# Patient Record
Sex: FEMALE | Race: WHITE | NOT HISPANIC OR LATINO | Employment: FULL TIME | URBAN - NONMETROPOLITAN AREA
[De-identification: names, ages, dates, MRNs, and addresses within clinical notes are randomized per-mention and may not be internally consistent; named-entity substitution may affect disease eponyms.]

---

## 2023-09-13 PROBLEM — E04.1 THYROID NODULE: Status: ACTIVE | Noted: 2023-09-13

## 2023-09-13 PROBLEM — H66.92 ACUTE LEFT OTITIS MEDIA: Status: RESOLVED | Noted: 2023-09-13 | Resolved: 2023-09-13

## 2023-10-23 ENCOUNTER — OFFICE VISIT (OUTPATIENT)
Dept: PRIMARY CARE | Facility: CLINIC | Age: 58
End: 2023-10-23
Payer: COMMERCIAL

## 2023-10-23 VITALS
SYSTOLIC BLOOD PRESSURE: 122 MMHG | BODY MASS INDEX: 20.44 KG/M2 | HEIGHT: 66 IN | WEIGHT: 127.2 LBS | OXYGEN SATURATION: 99 % | HEART RATE: 79 BPM | DIASTOLIC BLOOD PRESSURE: 70 MMHG

## 2023-10-23 DIAGNOSIS — Z00.00 WELL ADULT EXAM: Primary | ICD-10-CM

## 2023-10-23 DIAGNOSIS — E04.1 THYROID NODULE: ICD-10-CM

## 2023-10-23 PROCEDURE — 1036F TOBACCO NON-USER: CPT | Performed by: FAMILY MEDICINE

## 2023-10-23 PROCEDURE — 99396 PREV VISIT EST AGE 40-64: CPT | Performed by: FAMILY MEDICINE

## 2023-10-23 NOTE — PROGRESS NOTES
"Subjective   Patient ID: Rustam Dickerson is a 58 y.o. female who presents for Annual Exam.    HPI   Since the last office visit there have been no interval operations, hospitalizations, important illnesses or injuries.  [place] for gyn and mmg.  Colon jan 22 at 5 yr due on 28  Tob -0 alc 2 wk, no illicits. Works at [employer]  Exercise- 4x wk  Review of Systems  General-no fatigue weight to within 10 pounds  ENT no problems with vision swallowing  Cardiac no chest pains palpitations change in exercise tolerance or capacity  Pulmonary no cough shortness of breath  GI no heartburn or abdominal pain  Musculoskeletal no joint pains    Objective   /70   Pulse 79   Ht 1.676 m (5' 6\")   Wt 57.7 kg (127 lb 3.2 oz)   SpO2 99%   BMI 20.53 kg/m²     Physical Exam  General:  Alert, No acute distress. Appears stated age  Eye:  Pupils are equal, round and reactive to light, Extraocular movements are intact, Normal conjunctiva.    Neck:  Supple, Non-tender, No carotid bruit, No jugular venous distention, No lymphadenopathy, No thyromegaly.    Respiratory:  Lungs are clear to auscultation, Respirations are non-labored, Breath sounds are equal.    Cardiovascular:  Normal rate, Regular rhythm, No murmur.    Gastrointestinal:  Soft, Non-tender, No organomegaly. No solid or pulsatile mass  Integumentary:  Warm, Dry. No concerning lesions on exposed areas  Neurologic:  Alert, Oriented.  Gross and fine motor intact, CN 2-12 intact  Psychiatric:  Cooperative, Appropriate mood & affect.  Assessment/Plan   Problem List Items Addressed This Visit             ICD-10-CM    Thyroid nodule E04.1    Relevant Orders    CBC    Comprehensive Metabolic Panel    Lipid Panel    Thyroid Stimulating Hormone    Thyroid Peroxidase (TPO) Antibody    Well adult exam - Primary Z00.00    Relevant Orders    CBC    Comprehensive Metabolic Panel    Lipid Panel    Follow Up In Primary Care - Established          "

## 2023-11-06 ENCOUNTER — OFFICE VISIT (OUTPATIENT)
Dept: OBSTETRICS AND GYNECOLOGY | Facility: CLINIC | Age: 58
End: 2023-11-06
Payer: COMMERCIAL

## 2023-11-06 VITALS
SYSTOLIC BLOOD PRESSURE: 120 MMHG | HEIGHT: 66 IN | BODY MASS INDEX: 20.09 KG/M2 | DIASTOLIC BLOOD PRESSURE: 68 MMHG | WEIGHT: 125 LBS

## 2023-11-06 DIAGNOSIS — Z12.31 BREAST CANCER SCREENING BY MAMMOGRAM: Primary | ICD-10-CM

## 2023-11-06 PROCEDURE — 1036F TOBACCO NON-USER: CPT | Performed by: OBSTETRICS & GYNECOLOGY

## 2023-11-06 PROCEDURE — 99396 PREV VISIT EST AGE 40-64: CPT | Performed by: OBSTETRICS & GYNECOLOGY

## 2023-11-06 NOTE — PROGRESS NOTES
"Subjective   Patient ID: Rustam Dickerson is a 58 y.o. female who presents for Gynecologic Exam (Patient is here for a yearly exam. Patient has no concerns.).  HPI  Jasmin is a 58-year-old woman who comes in for routine GYN exam.  She has no history of abnormal Pap smears she had a colonoscopy less than a year ago which was totally normal.  She is not taking any type of hormone replacement therapy.  She is due for mammogram this month.      Review of Systems  Denies any significant change in health  Denies any shortness of breath  Denies any chest pain or palpitations  Denies any abdominal pain or changes in bowel habits  GYN Per HPI  Musculoskeletal denies any changes in mobility  Psych denies any changes in her mood or in her sleep  Objective   Physical Exam      10/19/2021     8:19 AM 11/3/2021    11:23 AM 10/20/2022     8:10 AM 11/4/2022     9:17 AM 1/10/2023     8:22 AM 10/23/2023     7:58 AM 11/6/2023     9:35 AM   Vitals   Systolic 102 110 114 116 112 122 120   Diastolic 70 72 72 76 60 70 68   Heart Rate 83  86  88 79    Temp 36.6 °C (97.8 °F) 37.1 °C (98.7 °F)        Height (in) 1.676 m (5' 6\") 1.676 m (5' 6\") 1.676 m (5' 6\") 1.676 m (5' 6\") 1.676 m (5' 6\") 1.676 m (5' 6\") 1.676 m (5' 6\")   Weight (lb) 119.25 121.25 122.56 122.8 122 127.2 125   BMI 19.25 kg/m2 19.57 kg/m2 19.78 kg/m2 19.82 kg/m2 19.69 kg/m2 20.53 kg/m2 20.18 kg/m2   BSA (m2) 1.59 m2 1.6 m2 1.61 m2 1.61 m2 1.6 m2 1.64 m2 1.62 m2   Visit Report      Report Report   Pleasant healthy appearing  Head head and neck no lesions supple without adenopathy  Heart regular rate and rhythm  Lungs clear to auscultation  Breast no masses discharge retraction or skin changes  Abdomen soft nontender  External genitalia revealed no lesions the vagina was mildly atrophic uterus and adnexa were normal size nontender  Extremities good mobility  Psych appropriately oriented with normal mood and affect  Assessment/Plan     Aiyana is a 58-year-old woman who comes in for " routine GYN exam exam was benign Pap smear not indicated mammogram ordered follow-up in 1 year

## 2023-11-08 ENCOUNTER — ANCILLARY PROCEDURE (OUTPATIENT)
Dept: RADIOLOGY | Facility: CLINIC | Age: 58
End: 2023-11-08
Payer: COMMERCIAL

## 2023-11-08 DIAGNOSIS — Z12.31 BREAST CANCER SCREENING BY MAMMOGRAM: ICD-10-CM

## 2023-11-08 PROCEDURE — 77063 BREAST TOMOSYNTHESIS BI: CPT | Performed by: RADIOLOGY

## 2023-11-08 PROCEDURE — 77067 SCR MAMMO BI INCL CAD: CPT | Performed by: RADIOLOGY

## 2023-11-08 PROCEDURE — 77063 BREAST TOMOSYNTHESIS BI: CPT

## 2023-11-13 ENCOUNTER — TELEPHONE (OUTPATIENT)
Dept: PRIMARY CARE | Facility: CLINIC | Age: 58
End: 2023-11-13
Payer: COMMERCIAL

## 2023-11-14 ENCOUNTER — TELEPHONE (OUTPATIENT)
Dept: PRIMARY CARE | Facility: CLINIC | Age: 58
End: 2023-11-14
Payer: COMMERCIAL

## 2023-11-14 NOTE — TELEPHONE ENCOUNTER
Her labs are okay.  The cholesterol is slightly above desirable but does not warrant any further treatment.  Her thyroid functions are all normal     You routed conversation to Curt Junior MD20 hours ago (12:44 PM)     You20 hours ago (12:44 PM)       PATIENT CALLING FOR RESULTS OF LAB TESTING      PATIENT NOTIFIED. VERBALIZED UNDERSTANDING

## 2024-03-26 ENCOUNTER — OFFICE VISIT (OUTPATIENT)
Dept: PRIMARY CARE | Facility: CLINIC | Age: 59
End: 2024-03-26
Payer: COMMERCIAL

## 2024-03-26 VITALS
OXYGEN SATURATION: 99 % | HEIGHT: 66 IN | WEIGHT: 126.5 LBS | SYSTOLIC BLOOD PRESSURE: 126 MMHG | BODY MASS INDEX: 20.33 KG/M2 | DIASTOLIC BLOOD PRESSURE: 76 MMHG | HEART RATE: 81 BPM

## 2024-03-26 DIAGNOSIS — S76.219A STRAIN OF ADDUCTOR MUSCLE OF THIGH: Primary | ICD-10-CM

## 2024-03-26 PROCEDURE — 99213 OFFICE O/P EST LOW 20 MIN: CPT | Performed by: FAMILY MEDICINE

## 2024-03-26 PROCEDURE — 1036F TOBACCO NON-USER: CPT | Performed by: FAMILY MEDICINE

## 2024-03-26 RX ORDER — PREDNISONE 10 MG/1
40 TABLET ORAL DAILY
Qty: 20 TABLET | Refills: 0 | Status: SHIPPED | OUTPATIENT
Start: 2024-03-26 | End: 2024-03-31

## 2024-03-26 NOTE — PROGRESS NOTES
"Subjective   Patient ID: Rustam Dickerson is a 58 y.o. female who presents for Leg Pain (Top of thigh).    HPI L ant  thigh form hip to knee. For 3 weeks. Sharp pain, slowly getting better..  has been working out walking 4 miles.  No known direct injury    Review of Systems    Objective   /76   Pulse 81   Ht 1.676 m (5' 6\")   Wt 57.4 kg (126 lb 8 oz)   SpO2 99%   BMI 20.42 kg/m²     Physical Exam  Strength to hip flexion abduction and adduction is intact the hip.  Strength to flexion extension at the knee is intact.  Palpation in the femoral triangle and the anterior quads are nontender.  Assessment/Plan   Problem List Items Addressed This Visit    None  Visit Diagnoses         Codes    Strain of adductor muscle of thigh    -  Primary S76.219A    Relevant Medications    predniSONE (Deltasone) 10 mg tablet        Reviewed use of prednisone, if not improved in a couple weeks consider x-ray and physical therapy.       "

## 2024-10-25 ENCOUNTER — APPOINTMENT (OUTPATIENT)
Dept: PRIMARY CARE | Facility: CLINIC | Age: 59
End: 2024-10-25
Payer: COMMERCIAL

## 2024-10-25 VITALS
HEIGHT: 66 IN | DIASTOLIC BLOOD PRESSURE: 80 MMHG | WEIGHT: 130.4 LBS | BODY MASS INDEX: 20.96 KG/M2 | OXYGEN SATURATION: 98 % | SYSTOLIC BLOOD PRESSURE: 120 MMHG | HEART RATE: 84 BPM

## 2024-10-25 DIAGNOSIS — Z00.00 WELL ADULT EXAM: Primary | Chronic | ICD-10-CM

## 2024-10-25 DIAGNOSIS — Z12.31 BREAST CANCER SCREENING BY MAMMOGRAM: ICD-10-CM

## 2024-10-25 PROCEDURE — 1036F TOBACCO NON-USER: CPT | Performed by: FAMILY MEDICINE

## 2024-10-25 PROCEDURE — 3008F BODY MASS INDEX DOCD: CPT | Performed by: FAMILY MEDICINE

## 2024-10-25 PROCEDURE — 99396 PREV VISIT EST AGE 40-64: CPT | Performed by: FAMILY MEDICINE

## 2024-10-25 NOTE — PROGRESS NOTES
"Subjective   Patient ID: Rustam Dickerson is a 59 y.o. female who presents for Annual Exam.    HPI   Colon due in 28  Pap upcoming,  Mammogram ordered  Mother a w, hl  Dad-aw prostate ca  3sibs htn, hl  Exercise+, no tob,etoh, illicit    Review of Systems  General-no fatigue weight to within 10 pounds  ENT no problems with vision swallowing  Cardiac no chest pains palpitations change in exercise tolerance or capacity  Pulmonary no cough shortness of breath  GI no heartburn or abdominal pain  Musculoskeletal no joint pains    Objective   /80   Pulse 84   Ht 1.676 m (5' 6\")   Wt 59.1 kg (130 lb 6.4 oz)   SpO2 98%   BMI 21.05 kg/m²     Physical Exam  General:  Alert, No acute distress. Appears stated age  Eye:  Pupils are equal, round and reactive to light, Extraocular movements are intact, Normal conjunctiva.    Neck:  Supple, Non-tender, No carotid bruit, No jugular venous distention, No lymphadenopathy, No thyromegaly.    Respiratory:  Lungs are clear to auscultation, Respirations are non-labored, Breath sounds are equal.    Cardiovascular:  Normal rate, Regular rhythm, No murmur.    Gastrointestinal:  Soft, Non-tender, No organomegaly. No solid or pulsatile mass  Integumentary:  Warm, Dry. No concerning lesions on exposed areas  Neurologic:  Alert, Oriented.  Gross and fine motor intact, CN 2-12 intact  Psychiatric:  Cooperative, Appropriate mood & affect.    Assessment/Plan   Problem List Items Addressed This Visit             ICD-10-CM    Well adult exam - Primary Z00.00    Relevant Orders    CBC    Comprehensive Metabolic Panel    Follow Up In Primary Care     Other Visit Diagnoses         Codes    Breast cancer screening by mammogram     Z12.31    Relevant Orders    BI mammo bilateral screening tomosynthesis               "

## 2024-11-08 ENCOUNTER — APPOINTMENT (OUTPATIENT)
Dept: OBSTETRICS AND GYNECOLOGY | Facility: CLINIC | Age: 59
End: 2024-11-08
Payer: COMMERCIAL

## 2024-11-11 ENCOUNTER — HOSPITAL ENCOUNTER (OUTPATIENT)
Dept: RADIOLOGY | Facility: CLINIC | Age: 59
Discharge: HOME | End: 2024-11-11
Payer: COMMERCIAL

## 2024-11-11 VITALS — BODY MASS INDEX: 20.95 KG/M2 | WEIGHT: 130.38 LBS | HEIGHT: 66 IN

## 2024-11-11 DIAGNOSIS — Z12.31 BREAST CANCER SCREENING BY MAMMOGRAM: ICD-10-CM

## 2024-11-11 PROCEDURE — 77067 SCR MAMMO BI INCL CAD: CPT | Performed by: RADIOLOGY

## 2024-11-11 PROCEDURE — 77067 SCR MAMMO BI INCL CAD: CPT

## 2024-11-11 PROCEDURE — 77063 BREAST TOMOSYNTHESIS BI: CPT | Performed by: RADIOLOGY

## 2024-11-18 ENCOUNTER — APPOINTMENT (OUTPATIENT)
Dept: PRIMARY CARE | Facility: CLINIC | Age: 59
End: 2024-11-18
Payer: COMMERCIAL

## 2024-11-18 VITALS
HEART RATE: 79 BPM | OXYGEN SATURATION: 99 % | SYSTOLIC BLOOD PRESSURE: 122 MMHG | WEIGHT: 132 LBS | DIASTOLIC BLOOD PRESSURE: 76 MMHG | BODY MASS INDEX: 21.21 KG/M2 | HEIGHT: 66 IN

## 2024-11-18 DIAGNOSIS — Z00.00 WELL WOMAN EXAM (NO GYNECOLOGICAL EXAM): Primary | ICD-10-CM

## 2024-11-18 PROCEDURE — 3008F BODY MASS INDEX DOCD: CPT | Performed by: STUDENT IN AN ORGANIZED HEALTH CARE EDUCATION/TRAINING PROGRAM

## 2024-11-18 PROCEDURE — 1036F TOBACCO NON-USER: CPT | Performed by: STUDENT IN AN ORGANIZED HEALTH CARE EDUCATION/TRAINING PROGRAM

## 2024-11-18 PROCEDURE — 99396 PREV VISIT EST AGE 40-64: CPT | Performed by: STUDENT IN AN ORGANIZED HEALTH CARE EDUCATION/TRAINING PROGRAM

## 2024-11-18 ASSESSMENT — PATIENT HEALTH QUESTIONNAIRE - PHQ9
1. LITTLE INTEREST OR PLEASURE IN DOING THINGS: NOT AT ALL
SUM OF ALL RESPONSES TO PHQ9 QUESTIONS 1 & 2: 0
2. FEELING DOWN, DEPRESSED OR HOPELESS: NOT AT ALL

## 2024-11-18 NOTE — PROGRESS NOTES
=Assessment/Plan:  Rustam Dickerson is a 59 y.o. female who presents to clinic today to address the following issues:   1. Well woman exam (no gynecological exam)          Woman exam completed.  Patient declines pelvic exam today since she is not due for Pap smear.  She will be due for Pap smear in 2027 but would like to get it done next year.  Breast exam completed within normal limits.    Pap smear: Nov 2022, cotest negative  Mammogram: 11/2024  DV: neg 11/2024  DEXA: JULISSA    Problem List Items Addressed This Visit    None  Visit Diagnoses       Well woman exam (no gynecological exam)    -  Primary            There are no Patient Instructions on file for this visit.    Follow up: 1 year     Return precautions discussed.  An After Visit Summary was given to the patient.  All questions were answered and patient in agreement with plan.    Subjective:  Rustam Dickerson is a 59 y.o. female who presents to clinic today for Gynecologic Exam      HPI:    Gynecologic History:  (Please complete obstetric history in the history tab)  - Do you have any menstrual concerns? no  - Do you have any breast concerns? no    - Date of last pap smear: 11/2022  - Results of last pap smear, if known: yes remotely  - Personal history of prior abnormal pap smear?: yes, abnormal but never needed biopsies     - Date of last mammogram: 11/11/24  - Results of last mammogram: BIRADS 1  - Personal history of prior abnormal mammogram: yes, several years ago, has prior markers    Sexual history (provider to ask):  - Have you been sexually active within the past year? yes  - What are the genders of your sexual partner(s)? male    Domestic Violence Screening (Provider to ask):  Because violence is so common in many people's lives and because there is help available for those being abused, I now ask every patient about domestic violence:  - Within the past year have you been hit, slapped, kicked or otherwise physically hurt by someone?  "no  - Are you in a relationship with a person who threatens or physically hurts you? no  - Has anyone forced you to have sexual activities that made you feel uncomfortable? no      Review of Systems    Objective:  /76   Pulse 79   Ht 1.676 m (5' 5.98\")   Wt 59.9 kg (132 lb)   SpO2 99%   BMI 21.32 kg/m²     Physical Exam  Vitals and nursing note reviewed. Exam conducted with a chaperone present.   Constitutional:       General: She is not in acute distress.     Appearance: Normal appearance.   HENT:      Head: Normocephalic and atraumatic.      Mouth/Throat:      Mouth: Mucous membranes are moist.   Eyes:      General: No scleral icterus.        Right eye: No discharge.         Left eye: No discharge.      Extraocular Movements: Extraocular movements intact.      Conjunctiva/sclera: Conjunctivae normal.   Pulmonary:      Effort: Pulmonary effort is normal. No respiratory distress.   Chest:      Chest wall: No mass, lacerations or deformity.   Breasts:     Acosta Score is 5.      Breasts are symmetrical.      Right: Normal.      Left: Normal.   Abdominal:      General: Abdomen is flat. There is no distension.      Palpations: Abdomen is soft.   Lymphadenopathy:      Upper Body:      Right upper body: No supraclavicular, axillary or pectoral adenopathy.      Left upper body: No supraclavicular, axillary or pectoral adenopathy.   Skin:     General: Skin is warm and dry.   Neurological:      General: No focal deficit present.      Mental Status: She is alert and oriented to person, place, and time.   Psychiatric:         Attention and Perception: Attention normal.         Mood and Affect: Mood normal.         Speech: Speech normal.         Behavior: Behavior normal.         Cognition and Memory: Cognition and memory normal.         Judgment: Judgment normal.         I spent 15 minutes in total time for this visit including all related clinical activities before, during, and after the visit excluding other " billable activities/procedure time.     Nery Wheat MD

## 2024-12-20 ENCOUNTER — TELEPHONE (OUTPATIENT)
Age: 59
End: 2024-12-20
Payer: COMMERCIAL

## 2024-12-20 DIAGNOSIS — U07.1 COVID: Primary | ICD-10-CM

## 2024-12-20 RX ORDER — NIRMATRELVIR AND RITONAVIR 300-100 MG
3 KIT ORAL 2 TIMES DAILY
Qty: 30 TABLET | Refills: 0 | Status: SHIPPED | OUTPATIENT
Start: 2024-12-20 | End: 2024-12-25

## 2024-12-20 NOTE — TELEPHONE ENCOUNTER
PATIENT STARTED WITH  VID  SYMPTOMS TUESDAY.  TESTED POSITIVE TODAY FOR CO VID.   WOULD LIKE [facility].   [facility]

## 2025-06-30 ENCOUNTER — TELEPHONE (OUTPATIENT)
Age: 60
End: 2025-06-30
Payer: COMMERCIAL

## 2025-06-30 DIAGNOSIS — T75.3XXA MOTION SICKNESS, INITIAL ENCOUNTER: Primary | ICD-10-CM

## 2025-06-30 RX ORDER — SCOPOLAMINE 1 MG/3D
1 PATCH, EXTENDED RELEASE TRANSDERMAL
Qty: 4 PATCH | Refills: 2 | Status: SHIPPED | OUTPATIENT
Start: 2025-06-30 | End: 2025-08-29

## 2025-06-30 NOTE — TELEPHONE ENCOUNTER
PATIENT IS GOING [facility] [employer] FOR  7 DAYS .   WOULD  LIKE PATCH FOR MOTION SICKNESS  .    [facility]

## 2025-10-28 ENCOUNTER — APPOINTMENT (OUTPATIENT)
Age: 60
End: 2025-10-28
Payer: COMMERCIAL

## 2025-11-18 ENCOUNTER — APPOINTMENT (OUTPATIENT)
Age: 60
End: 2025-11-18
Payer: COMMERCIAL